# Patient Record
Sex: MALE | Race: OTHER | NOT HISPANIC OR LATINO | URBAN - METROPOLITAN AREA
[De-identification: names, ages, dates, MRNs, and addresses within clinical notes are randomized per-mention and may not be internally consistent; named-entity substitution may affect disease eponyms.]

---

## 2019-03-27 ENCOUNTER — EMERGENCY (EMERGENCY)
Facility: HOSPITAL | Age: 49
LOS: 1 days | Discharge: HOME | End: 2019-03-27
Attending: EMERGENCY MEDICINE
Payer: COMMERCIAL

## 2019-03-27 VITALS
HEART RATE: 85 BPM | OXYGEN SATURATION: 98 % | TEMPERATURE: 99 F | WEIGHT: 173.94 LBS | RESPIRATION RATE: 18 BRPM | SYSTOLIC BLOOD PRESSURE: 158 MMHG | HEIGHT: 71 IN | DIASTOLIC BLOOD PRESSURE: 93 MMHG

## 2019-03-27 PROCEDURE — 99283 EMERGENCY DEPT VISIT LOW MDM: CPT

## 2019-03-27 RX ORDER — POLYMYXIN B SULF/TRIMETHOPRIM 10000-1/ML
1 DROPS OPHTHALMIC (EYE) ONCE
Qty: 0 | Refills: 0 | Status: COMPLETED | OUTPATIENT
Start: 2019-03-27 | End: 2019-03-27

## 2019-03-27 RX ADMIN — Medication 1 DROP(S): at 12:52

## 2019-03-27 NOTE — ED STATDOCS - OBJECTIVE STATEMENT
Pt is a 49 y/o M presenting to the ED with c/o left eye pain and swelling, onset yesterday. 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 Was able to flush out the eyes but reports he still feels a FB sensation in the eye. Woke up this AM with crusting in the left eye. Associated vision blurring in the left eye. Denies photophobia. Does not wear contacts or glasses at baseline.

## 2019-03-27 NOTE — ED STATDOCS - PROGRESS NOTE DETAILS
Pt seen and evaluated. Eye examined. Mild conjunctival erythema. fluorescence - No corneal abrasions or fb appreciated on exam. lids retracted no fb seen. EOMI, PERRLA. Eye irrigated. Will dc home on Abx drops and instructed to f/u with optho if s/s persist. Pt verbalizes understanding

## 2019-03-27 NOTE — ED STATDOCS - ATTENDING CONTRIBUTION TO CARE
Yenny: I performed a face to face bedside interview with patient regarding history of present illness, review of symptoms and past medical history. I completed an independent physical exam and ordered tests/medications as needed.  I have discussed patient's plan of care with advanced care provider. The advanced care provider assisted in  executing the discussed plan. I was available for any questions or issues that may have arose during the execution of the plan of care.

## 2019-03-27 NOTE — ED ADULT TRIAGE NOTE - CHIEF COMPLAINT QUOTE
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

## 2019-03-27 NOTE — ED STATDOCS - CLINICAL SUMMARY MEDICAL DECISION MAKING FREE TEXT BOX
Fluorescin exam, eye irritation, and erythromycin ointment. Likely had FB but nothing seen currently. Likely continued irritation. Will give erythromycin and ophthalmology follow up./

## 2020-08-13 NOTE — ED ADULT TRIAGE NOTE - AS O2 DELIVERY
-- DO NOT REPLY / DO NOT REPLY ALL --  -- Message is from the [facility]--    COVID-19 Universal Screening: N/A - Not about scheduling    General Patient Message      Reason for Call: I need prior authorization for a procedure set for 8/19/2020.  Codes:   40660,  54922, 58911, 36223, 28900  49336, 29994, 05357, 48406  57818, 04972, 36511 right & left  37939, 20275, 32564    Caller Information       Type Contact Phone    08/13/2020 01:11 PM Phone (Incoming) Pat @  [facility] Dr. Avila (Provider) [phone]          Alternative phone number: None    Turnaround time given to caller:   \"This message will be sent to [state Provider's name]. The clinical team will fulfill your request as soon as they review your message.\"     room air